# Patient Record
Sex: MALE | Race: WHITE
[De-identification: names, ages, dates, MRNs, and addresses within clinical notes are randomized per-mention and may not be internally consistent; named-entity substitution may affect disease eponyms.]

---

## 2018-01-11 ENCOUNTER — RECORDS - HEALTHEAST (OUTPATIENT)
Dept: ADMINISTRATIVE | Facility: OTHER | Age: 48
End: 2018-01-11

## 2018-03-15 ENCOUNTER — COMMUNICATION - HEALTHEAST (OUTPATIENT)
Dept: TELEHEALTH | Facility: CLINIC | Age: 48
End: 2018-03-15

## 2018-03-15 ENCOUNTER — OFFICE VISIT - HEALTHEAST (OUTPATIENT)
Dept: FAMILY MEDICINE | Facility: CLINIC | Age: 48
End: 2018-03-15

## 2018-03-15 DIAGNOSIS — J30.9 ALLERGIC RHINITIS: ICD-10-CM

## 2018-03-15 DIAGNOSIS — F41.1 ANXIETY STATE: ICD-10-CM

## 2018-03-15 DIAGNOSIS — Z71.85 IMMUNIZATION COUNSELING: ICD-10-CM

## 2018-03-15 ASSESSMENT — MIFFLIN-ST. JEOR: SCORE: 1605.41

## 2019-11-01 ENCOUNTER — RECORDS - HEALTHEAST (OUTPATIENT)
Dept: ADMINISTRATIVE | Facility: OTHER | Age: 49
End: 2019-11-01

## 2020-03-11 ENCOUNTER — COMMUNICATION - HEALTHEAST (OUTPATIENT)
Dept: FAMILY MEDICINE | Facility: CLINIC | Age: 50
End: 2020-03-11

## 2020-03-13 ENCOUNTER — VIRTUAL VISIT (OUTPATIENT)
Dept: FAMILY MEDICINE | Facility: OTHER | Age: 50
End: 2020-03-13

## 2020-03-13 NOTE — PROGRESS NOTES
"Date: 2020 17:02:09  Clinician: Ashley Pace  Clinician NPI: [id number]  Patient: Brown Montoya  Patient : 1970  Patient Address: [address], [place] [address]  Patient Phone: [phone]  Visit Protocol: URI  Patient Summary:  Brown is a 49 year old ( : 1970 ) male who initiated a Visit for COVID-19 (Coronavirus) evaluation and screening. When asked the question \"Please sign me up to receive news, health information and promotions. \", Brown responded \"No\".    Brown states his symptoms started suddenly 7-9 days ago. After his symptoms started, they improved and then got worse again.   His symptoms consist of rhinitis, wheezing, malaise, a cough, a headache, tooth pain, chills, facial pain or pressure, and myalgia. He is experiencing mild difficulty breathing with activities but can speak normally in full sentences. Brown also feels feverish.   Symptom details     Nasal secretions: The color of his mucus is green and blood-tinged.    Cough: Brown coughs almost every minute and his cough is not more bothersome at night. Phlegm comes into his throat when he coughs. He does not believe his cough is caused by post-nasal drip. The color of the phlegm is green and blood-tinged.     Temperature: His current temperature is 97.8 degrees Fahrenheit.     Wheezing: Brown has not ever been diagnosed with asthma. The wheezing interferes with his normal daily activities.    Facial pain or pressure: The facial pain or pressure feels worse when bending over or leaning forward.     Headache: He states the headache is moderate (4-6 on a 10 point pain scale).     Tooth pain: The tooth pain is not caused by a cavity, recent dental work, or other mouth problems.      Brown denies having ear pain, sore throat, and nasal congestion. He also denies taking antibiotic medication for the symptoms, having recent facial or sinus surgery in the past 60 days, and having a sinus infection within " the past year.   Precipitating events  He has not recently been exposed to someone with influenza. Brown has been in close contact with the following high risk individuals: people with asthma, heart disease or diabetes, immunocompromised people, adults 65 or older, and children under the age of 5.   Pertinent COVID-19 (Coronavirus) information  Brown has not traveled internationally in the last 14 days before the start of his symptoms.   Brown has not had close contact with a laboratory confirmed positive COVID-19 patient within 14 days of symptom onset.   Triage Point(s) temporarily suspended for COVID-19 (Coronavirus) screening  Brown reported the following symptoms which were previously protocol referral points. These protocol referral points have temporarily been removed for purposes of COVID-19 (Coronavirus) screening.     Wheezing that keeps Brown from doing daily activities  97.8   Pertinent medical history  Brown does not need a return to work/school note.   Weight: 155 lbs   Brown does not smoke or use smokeless tobacco.   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 where there are people attending from man different countries. Also, I have already tested negative for Flu and Strep on Mar 4th-I am still dealing with symptoms that may be coronavirus   Weight: 155 lbs    MEDICATIONS: No current medications, ALLERGIES: NKDA  Clinician Response:  Dear Brown,  Based on the information provided, you have acute bacterial sinusitis, also known as a sinus infection. Sinus infections are caused by bacteria or a virus and symptoms are almost always identical. The difference between  the 2 types of infections is timing.  Sinus infections start as viral infections and symptoms improve on their own in about 7 days. If symptoms have not improved after 7 days or have even worsened, a bacterial infection may have developed.  Medication information  I am prescribing:       Fluticasone 50 mcg/actuation nasal spray. Inhale 2 sprays in each nostril 1 time per day; after 1 week, may adjust to 1 - 2 sprays in each nostril 1 time per day. This medication takes several days to start working, so keep taking it even if it doesn't help right away. There are no refills with this prescription.      Amoxicillin-pot clavulanate 875-125 mg oral tablet. Take 1 tablet by mouth every 12 hours for 10 days. There are no refills with this prescription.      Benzonatate (Tessalon Perles) 100 mg oral capsule. Take 1-2 capsules by mouth 3 times per day as needed for your cough. There are no refills with this prescription.      Atrovent HFA 17 mcg/actuation aerosol inhaler. Inhale 2 puffs 4 times per day for 5 days (maximum dose: 12 inhalations in 24 hours). There are no refills with this prescription.     Unless you are allergic to the over-the-counter medication(s) below, I recommend using:       Acetaminophen (Tylenol or store brand) oral tablet. Take 1-2 tablets by mouth every 4-6 hours to help with the discomfort.      Ibuprofen (Advil or store brand) 200 mg oral tablet. Take 1-3 tablets (200-600 mg) by mouth every 8 hours to help with the discomfort. Make sure to take the ibuprofen with food. Do not exceed 2400 mg in 24 hours.    A decongestant such as Sudafed PE or store brand.      Saline nasal spray ([place] or store brand). Use 1-2 sprays in each nostril 3 times a day as needed for congestion.     Over-the-counter medications do not require a prescription. Ask the pharmacist if you have any questions.  Self care  The following tips will keep you as comfortable as possible while you recover:     Rest    Drink plenty of  water and other liquids    Take a hot shower to loosen congestion    Take a spoonful of honey to reduce your cough     When to seek care  Please be seen in a clinic or urgent care if any of the following occur:     Symptoms do not start to improve after 3 days of treatment    New symptoms develop, or symptoms become worse     It is possible to have an allergic reaction to an antibiotic even if you have not had one in the past. If you notice a new rash, significant swelling, or difficulty breathing, stop taking this medication immediately and go to a clinic or urgent care.  Additional treatment plan   Dear Brown,  Based on the information you have provided, it does not appear you need Coronavirus (COVID-19) testing.   At this time, we recommend testing primarily for those people who have symptoms of cough and fever and have either traveled to a known area of infection or have been exposed to someone with laboratory confirmed Coronavirus by close contact.   Coronavirus - General Information:   The coronavirus infection starts within 14 days of an exposure.  Symptoms are those of a respiratory infection (such as fever, cough).   If you have not had symptoms by day 15, you should be considered uninfected by coronavirus.   Coronavirus - Symptoms:    The coronavirus can cause a respiratory illness, such as bronchitis or pneumonia.  The most common symptoms are: cough, fever, and shortness of breath.   Other symptoms are: body aches, chills, diarrhea, fatigue, headache, runny nose, and sore throat   Coronavirus - Exposure Risk Factors:   Exposure to a person who has been diagnosed with coronavirus.  Travel from an area with recent local transmission of coronavirus.  The CDC (www.cdc.gov) has the most up-to-date list of where the coronavirus outbreak is occurring.   Coronavirus - Spreading:    The virus likely spreads through respiratory droplets produced when a person coughs or sneezes. These respiratory droplets can  travel approximately 6 feet and can remain on surfaces. Common disinfectants will kill the virus.  The CDC currently does not recommend healthy people wear masks.   Coronavirus - Protect Yourself:    Avoid close contact with people known to have this new coronavirus infection.  Wash hands often with soap and water or alcohol-based hand .  Avoid touching the eyes, nose or mouth.   Thank you for limiting contact with others, wearing a simple mask to cover your cough, practice good hand hygiene habits and accessing our virtual services where possible to limit the spread of this virus.  For more information about COVID19 and options for caring for yourself at home, please visit the CDC website at https://www.cdc.gov/coronavirus/2019-ncov/about/steps-when-sick.html   For more options for care at [facility], please visit our website at [email or web address]     Diagnosis: Cough  Diagnosis ICD: R05  Additional Clinician Notes: I understand your concern regarding Covid-19. However, domestic travel is not currently listed as a risk factor requiring testing at this time. The CDC is making updates to guidelines daily. So continue to monitor your symptoms as these guidelines may change.  Prescription: amoxicillin-pot clavulanate 875-125 mg oral tablet 20 tablet, 10 days supply. Take 1 tablet by mouth every 12 hours for 10 days. Refills: 0, Refill as needed: no, Allow substitutions: yes  Prescription: Atrovent HFA 17 mcg/actuation inhalation HFA aerosol inhaler 1 200 inhalation aerosol with adapter, 5 days supply. Inhale 2 puffs 4 times per day for 5 days. Refills: 0, Refill as needed: no, Allow substitutions: yes  Prescription: benzonatate (Tessalon Perles) 100 mg oral capsule 30 capsule, 5 days supply. Take 1-2 capsules by mouth 3 times per day as needed. Refills: 0, Refill as needed: no, Allow substitutions: yes  Prescription: fluticasone 50 mcg/actuation nasal spray,suspension 1 120  spray aerosol with adapter (grams), 30 days supply. Inhale 2 sprays in each nostril 1 time per day; after 1 week, may adjust to 1 - 2 sprays in each nostril 1 time per day.. Refills: 0, Refill as needed: no, Allow substitutions: yes  Pharmacy: [id number] - [phone] - [address] [place] [address]

## 2021-06-01 VITALS — WEIGHT: 152 LBS | HEIGHT: 73 IN | BODY MASS INDEX: 20.15 KG/M2

## 2021-06-06 NOTE — TELEPHONE ENCOUNTER
"Patient has an appointment with Orly at 11:00am 3/12/2020 appointment note stated    \" [facility] last week, negative for strep, flu. Still having fever, cough, chest congestion\"    Please triage patient thank you.   "

## 2021-06-16 NOTE — PROGRESS NOTES
"Subjective: Patient comes in for evaluation this 47-year-old male has not been seen for a few years.  He had previous history of anxiety especially surrounding public speaking.  He had been on propranolol takes 10 mg once as needed about half hour prior to that he has been quite effective for him in the past he wondered if he could get back on that    Also hyperlipidemia in the past as well as some allergic rhinitis.  He feels like his allergies are okay right now in the past he been on loratadine as well as some fluticasone.    We discussed health maintenance no follow-up for physical this summer    Immunizations he did get a TDA P as well as a influenza shot today.  Tobacco status: He  reports that he has never smoked. He has never used smokeless tobacco.    Patient Active Problem List    Diagnosis Date Noted     Acute Sinusitis      Anxiety      Allergic Rhinitis      Lyme Disease      Hyperlipidemia        Current Outpatient Prescriptions   Medication Sig Dispense Refill     propranolol (INDERAL) 10 MG tablet 1 po prn anxiety 30 tablet 2     No current facility-administered medications for this visit.        ROS:   10 point review of systems negative other than as outlined above.  Since his last been in no significant medical problems other than as outlined above    Objective:    /80 (Patient Site: Left Arm, Patient Position: Sitting, Cuff Size: Adult Large)  Pulse 63  Temp 97  F (36.1  C) (Oral)   Resp 16  Ht 6' 0.5\" (1.842 m)  Wt 152 lb (68.9 kg)  BMI 20.33 kg/m2  Body mass index is 20.33 kg/(m^2).      General appearance no acute distress vital signs are stable O2 sat was good heart was regular rate was 65 when I listen blood pressure normal    No results found for this or any previous visit.    Assessment:  1. Anxiety  propranolol (INDERAL) 10 MG tablet   2. Allergic rhinitis     3. Immunization counseling  Influenza, Seasonal Quad, Preservative Free 36+ Months    Tdap vaccine,  6yo or older,  IM "     Total time with patient 15 minutes over 10 minutes spent in counseling reviewing records discussing medications discussing his symptoms surrounding his anxiety with public speaking.    Plan: Above follow-up for physical      Immunizations updated with influenza and TDA P    No need for medications for any allergies to this point    Propranolol prescribed as discussed    This transcription uses voice recognition software, which may contain typographical errors.